# Patient Record
Sex: MALE | Race: BLACK OR AFRICAN AMERICAN | NOT HISPANIC OR LATINO | URBAN - METROPOLITAN AREA
[De-identification: names, ages, dates, MRNs, and addresses within clinical notes are randomized per-mention and may not be internally consistent; named-entity substitution may affect disease eponyms.]

---

## 2022-02-04 PROBLEM — Z00.00 ENCOUNTER FOR PREVENTIVE HEALTH EXAMINATION: Status: ACTIVE | Noted: 2022-02-04

## 2022-02-07 ENCOUNTER — OUTPATIENT (OUTPATIENT)
Dept: OUTPATIENT SERVICES | Facility: HOSPITAL | Age: 82
LOS: 1 days | End: 2022-02-07
Payer: MEDICARE

## 2022-02-07 ENCOUNTER — APPOINTMENT (OUTPATIENT)
Dept: CT IMAGING | Facility: CLINIC | Age: 82
End: 2022-02-07
Payer: MEDICARE

## 2022-02-07 DIAGNOSIS — D49.1 NEOPLASM OF UNSPECIFIED BEHAVIOR OF RESPIRATORY SYSTEM: ICD-10-CM

## 2022-02-07 PROCEDURE — 71260 CT THORAX DX C+: CPT | Mod: 26

## 2022-02-07 PROCEDURE — 82565 ASSAY OF CREATININE: CPT

## 2022-02-07 PROCEDURE — 71260 CT THORAX DX C+: CPT

## 2022-02-23 ENCOUNTER — APPOINTMENT (OUTPATIENT)
Dept: NUCLEAR MEDICINE | Facility: CLINIC | Age: 82
End: 2022-02-23
Payer: MEDICARE

## 2022-02-23 ENCOUNTER — OUTPATIENT (OUTPATIENT)
Dept: OUTPATIENT SERVICES | Facility: HOSPITAL | Age: 82
LOS: 1 days | End: 2022-02-23
Payer: MEDICARE

## 2022-02-23 DIAGNOSIS — Z00.8 ENCOUNTER FOR OTHER GENERAL EXAMINATION: ICD-10-CM

## 2022-02-23 PROCEDURE — 78815 PET IMAGE W/CT SKULL-THIGH: CPT

## 2022-02-23 PROCEDURE — 78815 PET IMAGE W/CT SKULL-THIGH: CPT | Mod: 26,PI

## 2022-02-23 PROCEDURE — A9552: CPT

## 2022-03-09 PROBLEM — E78.5 HYPERLIPIDEMIA: Status: RESOLVED | Noted: 2022-03-09 | Resolved: 2022-03-09

## 2022-03-09 PROBLEM — D51.8 VITAMIN B12 DEFICIENCY (DIETARY) ANEMIA: Status: RESOLVED | Noted: 2022-03-09 | Resolved: 2022-03-09

## 2022-03-09 PROBLEM — Z86.69 HISTORY OF POLYNEUROPATHY: Status: RESOLVED | Noted: 2022-03-09 | Resolved: 2022-03-09

## 2022-03-09 PROBLEM — Z86.79 HISTORY OF PERIPHERAL VASCULAR DISEASE: Status: RESOLVED | Noted: 2022-03-09 | Resolved: 2022-03-09

## 2022-03-09 PROBLEM — C32.0 SQUAMOUS CELL CARCINOMA OF LEFT VOCAL CORD: Status: ACTIVE | Noted: 2022-03-09

## 2022-03-09 PROBLEM — Z86.39 HISTORY OF VITAMIN D DEFICIENCY: Status: RESOLVED | Noted: 2022-03-09 | Resolved: 2022-03-09

## 2022-03-09 PROBLEM — Z87.438 HISTORY OF BPH: Status: RESOLVED | Noted: 2022-03-09 | Resolved: 2022-03-09

## 2022-03-09 PROBLEM — I25.10 CARDIOVASCULAR DISEASE: Status: RESOLVED | Noted: 2022-03-09 | Resolved: 2022-03-09

## 2022-03-09 RX ORDER — VIT A AND D3 IN COD LIVER OIL 1250-135
CAPSULE ORAL
Refills: 0 | Status: ACTIVE | COMMUNITY

## 2022-03-09 RX ORDER — GABAPENTIN 600 MG/1
600 TABLET, COATED ORAL
Refills: 0 | Status: ACTIVE | COMMUNITY

## 2022-03-09 RX ORDER — FINASTERIDE 5 MG/1
5 TABLET, FILM COATED ORAL
Refills: 0 | Status: ACTIVE | COMMUNITY

## 2022-03-09 RX ORDER — TROLAMINE SALICYLATE 10 %
10 CREAM (GRAM) TOPICAL
Refills: 0 | Status: ACTIVE | COMMUNITY

## 2022-03-09 RX ORDER — POLYETHYLENE GLYCOL 3350 17 G/17G
17 POWDER, FOR SOLUTION ORAL
Refills: 0 | Status: ACTIVE | COMMUNITY

## 2022-03-09 RX ORDER — CHOLECALCIFEROL (VITAMIN D3) 1250 MCG
1.25 MG CAPSULE ORAL
Refills: 0 | Status: ACTIVE | COMMUNITY

## 2022-03-09 RX ORDER — ATORVASTATIN CALCIUM 40 MG/1
40 TABLET, FILM COATED ORAL
Refills: 0 | Status: ACTIVE | COMMUNITY

## 2022-03-09 RX ORDER — ASPIRIN ENTERIC COATED TABLETS 81 MG 81 MG/1
81 TABLET, DELAYED RELEASE ORAL
Refills: 0 | Status: ACTIVE | COMMUNITY

## 2022-03-09 RX ORDER — LIDOCAINE 4 %
4 ADHESIVE PATCH, MEDICATED TOPICAL
Refills: 0 | Status: ACTIVE | COMMUNITY

## 2022-03-09 RX ORDER — CHLORHEXIDINE GLUCONATE 4 %
325 (65 FE) LIQUID (ML) TOPICAL
Refills: 0 | Status: ACTIVE | COMMUNITY

## 2022-03-09 RX ORDER — TAMSULOSIN HYDROCHLORIDE 0.4 MG/1
0.4 CAPSULE ORAL
Refills: 0 | Status: ACTIVE | COMMUNITY

## 2022-03-09 RX ORDER — MENTHOL/CAMPHR/ANTIARTHRITIC 1 11 %-11 %
11-11 CREAM (ML) TOPICAL
Refills: 0 | Status: ACTIVE | COMMUNITY

## 2022-03-09 RX ORDER — POLYVINYL ALCOHOL 14 MG/ML
1.4 SOLUTION/ DROPS OPHTHALMIC
Refills: 0 | Status: ACTIVE | COMMUNITY

## 2022-03-09 RX ORDER — OMEPRAZOLE 40 MG/1
40 CAPSULE, DELAYED RELEASE ORAL
Refills: 0 | Status: ACTIVE | COMMUNITY

## 2022-03-09 NOTE — HISTORY OF PRESENT ILLNESS
[FreeTextEntry1] : 81 yr. 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 Moffett. \par He has had voice hoarseness for several months. Seen by Dr. Baker who referred him to Dr. Moffett. \par \par 11/5/2021 Laryngeal biopsy by Dr. Moffett. \par Pathology: Left vocal fold: Squamous cell carcinoma, moderately differentiated with suspected invasion.\par \par 2/7/2022 CT Chest: IMPRESSION: 3.8 x 1.7 cm left upper lobe soft tissue opacity with fiducial identified \par centrally; unclear if this finding represents radiation fibrosis or lung \par neoplasm. Additional 1.7 cm left apical mixed solid and ground-glass nodular \par opacity is compatible with primary lung malignancy.\par Right upper lobe wedge resection with focal 1 cm soft tissue adjacent to \par the superior aspect of the suture line; diagnostic considerations \par including scarring/postoperative change versus malignancy.\par Additional right apical linear nodular opacity measuring 2.5 x 0.5 cm; \par unclear if this represents scarring versus malignancy as well.\par Recommend correlation with prior imaging to assess stability.\par \par 2/23/2022 PET/CT: IMPRESSION:  FDG-PET/CT scan demonstrates: 1. Asymmetric FDG activity corresponding to mild thickening in the RIGHT posterior wall of the hypopharynx just medial to the piriform sinus extending into the right glottis, possibly corresponding to the newly diagnosed malignancy.\par 2. Nonspecific mildly FDG avid subcentimeter LEFT level 1A cervical lymph node. This may be further evaluated with ultrasound.\par 3. FDG avid left upper lobe soft tissue opacity with central fiducial marker, post radiation therapy changes versus recurrent disease. \par Additional mildly FDG avid 1.7 cm left apical mixed solid and groundglass nodular opacity, concerning for malignancy.\par 4. Minimally FDG avid soft tissue adjacent to suture in the right lung apex, probably postsurgical.\par 5. Mildly FDG avid pretracheal and bilateral perihilar lymph nodes are indeterminate..\par \par Presents today for consultation to discuss treatment with radiation. \par \par \par

## 2022-03-10 ENCOUNTER — APPOINTMENT (OUTPATIENT)
Dept: RADIATION ONCOLOGY | Facility: CLINIC | Age: 82
End: 2022-03-10

## 2022-03-10 DIAGNOSIS — E78.5 HYPERLIPIDEMIA, UNSPECIFIED: ICD-10-CM

## 2022-03-10 DIAGNOSIS — Z86.39 PERSONAL HISTORY OF OTHER ENDOCRINE, NUTRITIONAL AND METABOLIC DISEASE: ICD-10-CM

## 2022-03-10 DIAGNOSIS — Z87.438 PERSONAL HISTORY OF OTHER DISEASES OF MALE GENITAL ORGANS: ICD-10-CM

## 2022-03-10 DIAGNOSIS — Z86.79 PERSONAL HISTORY OF OTHER DISEASES OF THE CIRCULATORY SYSTEM: ICD-10-CM

## 2022-03-10 DIAGNOSIS — D51.8 OTHER VITAMIN B12 DEFICIENCY ANEMIAS: ICD-10-CM

## 2022-03-10 DIAGNOSIS — Z86.69 PERSONAL HISTORY OF OTHER DISEASES OF THE NERVOUS SYSTEM AND SENSE ORGANS: ICD-10-CM

## 2022-03-10 DIAGNOSIS — C32.0 MALIGNANT NEOPLASM OF GLOTTIS: ICD-10-CM

## 2022-03-10 DIAGNOSIS — I25.10 ATHEROSCLEROTIC HEART DISEASE OF NATIVE CORONARY ARTERY W/OUT ANGINA PECTORIS: ICD-10-CM
